# Patient Record
Sex: FEMALE | Race: WHITE | URBAN - METROPOLITAN AREA
[De-identification: names, ages, dates, MRNs, and addresses within clinical notes are randomized per-mention and may not be internally consistent; named-entity substitution may affect disease eponyms.]

---

## 2024-10-11 ENCOUNTER — APPOINTMENT (OUTPATIENT)
Dept: DERMATOLOGY | Facility: CLINIC | Age: 59
End: 2024-10-11
Payer: MEDICARE

## 2024-10-11 DIAGNOSIS — Z12.83 ENCOUNTER FOR SCREENING FOR MALIGNANT NEOPLASM OF SKIN: Primary | ICD-10-CM

## 2024-10-11 DIAGNOSIS — L81.4 LENTIGO: ICD-10-CM

## 2024-10-11 DIAGNOSIS — D69.2 SOLAR PURPURA (CMS-HCC): ICD-10-CM

## 2024-10-11 PROCEDURE — 1036F TOBACCO NON-USER: CPT | Performed by: NURSE PRACTITIONER

## 2024-10-11 PROCEDURE — 99203 OFFICE O/P NEW LOW 30 MIN: CPT | Performed by: NURSE PRACTITIONER

## 2024-10-11 RX ORDER — SODIUM BICARBONATE 650 MG/1
1 TABLET ORAL
COMMUNITY
Start: 2024-08-21

## 2024-10-11 RX ORDER — ERGOCALCIFEROL 1.25 MG/1
CAPSULE ORAL
COMMUNITY
Start: 2024-07-07

## 2024-10-11 RX ORDER — CYCLOBENZAPRINE HCL 5 MG
5 TABLET ORAL 3 TIMES DAILY PRN
COMMUNITY
Start: 2024-08-05

## 2024-10-11 RX ORDER — BUPROPION HYDROCHLORIDE 150 MG/1
1 TABLET ORAL
COMMUNITY
Start: 2024-08-05

## 2024-10-11 NOTE — PROGRESS NOTES
"Subjective     Scott Espinal is a 59 y.o. female who presents for the following: Suspicious Skin Lesion (Spots on bilateral arms that pt states \"come and go\". Pt denies pain or itching. Pt denies personal or family hx of any skin cancer. ).     Review of Systems:  No other skin or systemic complaints other than what is documented elsewhere in the note.    The following portions of the chart were reviewed this encounter and updated as appropriate:  Tobacco  Allergies  Meds  Problems  Med Hx  Surg Hx  Fam Hx       Skin Cancer History  No skin cancer on file.    Specialty Problems    None    Past Medical History:  Scott Espinal  has a past medical history of Abdominal distension (gaseous), Intestinal bypass and anastomosis status, Noninfective gastroenteritis and colitis, unspecified, Other conditions influencing health status, Other conditions influencing health status, Personal history of other diseases of the digestive system, Personal history of other specified conditions, Personal history of other specified conditions, Sleep disorder, unspecified, and Stenosis of anus and rectum.    Past Surgical History:  Scott Espinal  has a past surgical history that includes Other surgical history (11/19/2013); Other surgical history (11/19/2013); Other surgical history (11/19/2013); Other surgical history (11/19/2013); and Colectomy (11/19/2013).    Family History:  Patient family history is not on file.    Social History:  Scott Espinal  reports that she has never smoked. She has never used smokeless tobacco. No history on file for alcohol use and drug use.    Allergies:  Oxycodone and Oxycodone-acetaminophen    Current Medications / CAM's:    Current Outpatient Medications:     buPROPion XL (Wellbutrin XL) 150 mg 24 hr tablet, Take 1 tablet (150 mg) by mouth early in the morning.., Disp: , Rfl:     cyclobenzaprine (Flexeril) 5 mg tablet, Take 1 tablet (5 mg) by mouth 3 times a day as " needed for muscle spasms., Disp: , Rfl:     ergocalciferol (Vitamin D-2) 1.25 MG (30024 UT) capsule, TAKE 2 CAPSULES BY MOUTH ONCE A WEEK, Disp: , Rfl:     sodium bicarbonate 650 mg tablet, Take 1 tablet (650 mg) by mouth 3 times a day., Disp: , Rfl:     zinc acetate 50 mg (zinc) capsule, Take 1 capsule by mouth once daily., Disp: , Rfl:      Objective   Well appearing patient in no apparent distress; mood and affect are within normal limits.    A focused skin examination was performed. All findings within normal limits unless otherwise noted below.    Assessment/Plan   1. Encounter for screening for malignant neoplasm of skin  Scattered benign lesions    - Protective measures, such as avoiding skin exposure to sunlight during peak sun hours (10 AM to 3 PM), wearing protective clothing, and applying high-SPF sunscreen, are essential for reducing exposure to harmful ultraviolet (UV) light.  - Monthly self-examination of the skin is helpful to detect new lesions or changes in existing lesions.  - Discussed signs and symptoms of sun-related skin cancers.   - Make sure your moles are not signs of skin cancer (melanoma). Remember the ABCDEs of melanoma lesions:  A - Asymmetry: One half of the lesion does not mirror the other half.  B - Border: The borders are irregular or vague (indistinct).  C - Color: More than one color may be noted within the mole.  D - Diameter: Size greater than 6 mm (roughly the size of a pencil eraser) may be concerning.  E - Evolving: Notable changes in the lesion over time are suspicious signs for skin cancer.    2. Lentigo (3)  Left Arm, Neck, Right Arm  Scattered tan macules in sun-exposed areas.    A solar lentigo (plural, solar lentigines), sometimes called an age spot or liver spot, is a brown macule (small, flat, smooth area of skin) caused by chronic sun or artificial ultraviolet (UV) light exposure. There may be just one lentigo or there may be multiple. This type of lentigo is  different from lentigo simplex (discussed separately) because it is caused by exposure to UV light. Solar lentigines are benign, but they do indicate excessive sun exposure, a risk factor for the development of skin cancer.  Lesions are benign, no treatment needed.     3. Solar purpura (CMS-HCC) (2)  Left Arm, Right Arm  Violaceous patches and macules    - Solar purpura refers to red blood cells leaking into previously sun-damaged skin. It usually occurs on the arms and the backs of the hands in older adults. The skin appears to be bruised, with dark-red-to-purple discoloration. The bruised appearance may not heal for 2 or 3 weeks.  - Solar purpura is a common, harmless condition. Further exposure to the sun should be prevented by using a broad-spectrum sunscreen (one that protects against UVA and UVB rays) and by wearing sun-protective clothing such as long sleeves, long pants, wide-brimmed hats, etc. Wearing two layers of clothing to help minimize the effects of mild trauma to the skin may be helpful.      Please call me if there are any changes or development of concerning symptoms (lesion/skin condition is changing, bleeding, enlarging, or worsening).